# Patient Record
(demographics unavailable — no encounter records)

---

## 2025-03-28 NOTE — END OF VISIT
[FreeTextEntry3] :  I, Dr. Page, personally performed the evaluation and management (E/M) services for this new patient.  That E/M includes conducting the clinically appropriate initial history &/or exam, assessing all conditions, and establishing the plan of care.  Today, my MARQUISE, EventKloud Darryn, was here to observe my evaluation and management service for this patient & follow plan of care established by me going forward.

## 2025-03-28 NOTE — PHYSICAL EXAM
[Normal Appearance] : normal appearance [Well Groomed] : well groomed [General Appearance - In No Acute Distress] : no acute distress [Respiration, Rhythm And Depth] : normal respiratory rhythm and effort [Exaggerated Use Of Accessory Muscles For Inspiration] : no accessory muscle use [Urethral Meatus] : meatus normal [Penis Abnormality] : normal circumcised penis [Scrotum] : the scrotum was normal [Testes Tenderness] : no tenderness of the testes [Testes Mass (___cm)] : there were no testicular masses [Normal Station and Gait] : the gait and station were normal for the patient's age [] : no rash [No Focal Deficits] : no focal deficits [Oriented To Time, Place, And Person] : oriented to person, place, and time [Affect] : the affect was normal [Mood] : the mood was normal [de-identified] : B/L 20cc testes, no masses. No penile rash, lesions, drainage. Right 0.5cm palpable inguinal node

## 2025-03-28 NOTE — ASSESSMENT
[FreeTextEntry1] : 26yo M dysuria -UA, Ucx -ureaplasma, mycoplasma -GC/chlamydia will follow up with PCP  re ?swollen lymph nodes offered empiric abx refused f/u with PCP or ID

## 2025-03-28 NOTE — HISTORY OF PRESENT ILLNESS
[FreeTextEntry1] : Milton Benitez is a 25 year M presenting on 03/28/2025 No PMH: No meds Referred by: google  Burning with urination for past 1.5-2 weeks. Felt towards end of urethra. Saw Urgent care and PCP. STI testing negative.  Given abx, not started. Augmentin? Told B/L swollen inguinal lymph nodes. Approx 2 days ago noticed yellowish discharge. No hematuria. No fevers. Has been urinating less than usual. Harder to get erection and painful when erect. Never happened before In committed relationship, no new partners